# Patient Record
Sex: FEMALE | URBAN - METROPOLITAN AREA
[De-identification: names, ages, dates, MRNs, and addresses within clinical notes are randomized per-mention and may not be internally consistent; named-entity substitution may affect disease eponyms.]

---

## 2020-04-06 ENCOUNTER — APPOINTMENT (RX ONLY)
Dept: URBAN - METROPOLITAN AREA CLINIC 74 | Facility: CLINIC | Age: 30
Setting detail: DERMATOLOGY
End: 2020-04-06

## 2020-04-06 DIAGNOSIS — T07XXXA INSECT BITE, NONVENOMOUS, OF OTHER, MULTIPLE, AND UNSPECIFIED SITES, WITHOUT MENTION OF INFECTION: ICD-10-CM

## 2020-04-06 PROBLEM — L30.9 DERMATITIS, UNSPECIFIED: Status: ACTIVE | Noted: 2020-04-06

## 2020-04-06 PROCEDURE — ? COUNSELING

## 2020-04-06 PROCEDURE — 99202 OFFICE O/P NEW SF 15 MIN: CPT | Mod: 95

## 2020-04-06 PROCEDURE — ? PRESCRIPTION

## 2020-04-06 PROCEDURE — ? TREATMENT REGIMEN

## 2020-04-06 RX ORDER — MUPIROCIN 20 MG/G
OINTMENT TOPICAL
Qty: 1 | Refills: 1 | Status: ERX | COMMUNITY
Start: 2020-04-06

## 2020-04-06 RX ORDER — FLUOCINONIDE 0.5 MG/G
OINTMENT TOPICAL
Qty: 1 | Refills: 1 | Status: ERX | COMMUNITY
Start: 2020-04-06

## 2020-04-06 RX ADMIN — FLUOCINONIDE: 0.5 OINTMENT TOPICAL at 00:00

## 2020-04-06 RX ADMIN — MUPIROCIN: 20 OINTMENT TOPICAL at 00:00

## 2020-04-06 ASSESSMENT — LOCATION SIMPLE DESCRIPTION DERM
LOCATION SIMPLE: LEFT ANKLE
LOCATION SIMPLE: LEFT THIGH
LOCATION SIMPLE: RIGHT THIGH

## 2020-04-06 ASSESSMENT — LOCATION DETAILED DESCRIPTION DERM
LOCATION DETAILED: RIGHT ANTERIOR PROXIMAL THIGH
LOCATION DETAILED: LEFT ANKLE
LOCATION DETAILED: LEFT ANTERIOR LATERAL MALLEOLUS
LOCATION DETAILED: LEFT ANTERIOR PROXIMAL THIGH

## 2020-04-06 ASSESSMENT — LOCATION ZONE DERM: LOCATION ZONE: LEG

## 2020-04-06 NOTE — PROCEDURE: TREATMENT REGIMEN
Initiate Treatment: Doxycycline 100mg po BID x10 days \\nMupirocin ointment mix with Fluocinonide ointment apply to affected areas on legs BID x7-10 days
Detail Level: Zone
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
## 2020-04-06 NOTE — HPI: RASH
What Type Of Note Output Would You Prefer (Optional)?: Standard Output
How Severe Is Your Rash?: moderate
Is This A New Presentation, Or A Follow-Up?: Rash
Additional History: Rash inner thighs, legs and buttocks recurring since late January (pt says rash looks like small bumps ?bites that are extremely itchy). Especially at night. Patient reports she has bruising from scratching. Patient thought was allergy to detergent to changed detergent/soaps but didn’t help. Patient has checked bed for bed bugs but didn’t see any indication. Doesn’t not recall exposure to plants or brush or being bit by something. She has tried otc HC, anti fungal cream and old betamethasone Rx: steroids helped some with itching but not resolving. Patient is sending pics; see attachments. No contacts with similar rash.

## 2020-04-29 ENCOUNTER — APPOINTMENT (RX ONLY)
Dept: URBAN - METROPOLITAN AREA CLINIC 73 | Facility: CLINIC | Age: 30
Setting detail: DERMATOLOGY
End: 2020-04-29

## 2020-04-29 VITALS — TEMPERATURE: 97.3 F

## 2020-04-29 DIAGNOSIS — L738 OTHER SPECIFIED DISEASES OF HAIR AND HAIR FOLLICLES: ICD-10-CM

## 2020-04-29 DIAGNOSIS — L663 OTHER SPECIFIED DISEASES OF HAIR AND HAIR FOLLICLES: ICD-10-CM

## 2020-04-29 DIAGNOSIS — L73.9 FOLLICULAR DISORDER, UNSPECIFIED: ICD-10-CM

## 2020-04-29 DIAGNOSIS — T07XXXA INSECT BITE, NONVENOMOUS, OF OTHER, MULTIPLE, AND UNSPECIFIED SITES, WITHOUT MENTION OF INFECTION: ICD-10-CM | Status: INADEQUATELY CONTROLLED

## 2020-04-29 PROBLEM — L02.425 FURUNCLE OF RIGHT LOWER LIMB: Status: ACTIVE | Noted: 2020-04-29

## 2020-04-29 PROBLEM — L02.426 FURUNCLE OF LEFT LOWER LIMB: Status: ACTIVE | Noted: 2020-04-29

## 2020-04-29 PROBLEM — L30.9 DERMATITIS, UNSPECIFIED: Status: ACTIVE | Noted: 2020-04-29

## 2020-04-29 PROCEDURE — ? TREATMENT REGIMEN

## 2020-04-29 PROCEDURE — ? COUNSELING

## 2020-04-29 PROCEDURE — 11104 PUNCH BX SKIN SINGLE LESION: CPT

## 2020-04-29 PROCEDURE — ? BIOPSY BY PUNCH METHOD

## 2020-04-29 PROCEDURE — 99213 OFFICE O/P EST LOW 20 MIN: CPT | Mod: 25

## 2020-04-29 ASSESSMENT — LOCATION DETAILED DESCRIPTION DERM
LOCATION DETAILED: LEFT ANTERIOR PROXIMAL THIGH
LOCATION DETAILED: LEFT POPLITEAL SKIN
LOCATION DETAILED: LEFT DISTAL PRETIBIAL REGION
LOCATION DETAILED: LEFT ANTERIOR LATERAL MALLEOLUS
LOCATION DETAILED: LEFT HYPOTHENAR EMINENCE
LOCATION DETAILED: LEFT RIB CAGE
LOCATION DETAILED: RIGHT ANTERIOR PROXIMAL THIGH
LOCATION DETAILED: RIGHT DISTAL PRETIBIAL REGION
LOCATION DETAILED: RIGHT VENTRAL WRIST
LOCATION DETAILED: LEFT ANKLE

## 2020-04-29 ASSESSMENT — LOCATION SIMPLE DESCRIPTION DERM
LOCATION SIMPLE: LEFT ANKLE
LOCATION SIMPLE: RIGHT THIGH
LOCATION SIMPLE: ABDOMEN
LOCATION SIMPLE: LEFT HAND
LOCATION SIMPLE: RIGHT WRIST
LOCATION SIMPLE: RIGHT PRETIBIAL REGION
LOCATION SIMPLE: LEFT THIGH
LOCATION SIMPLE: LEFT POPLITEAL SKIN
LOCATION SIMPLE: LEFT PRETIBIAL REGION

## 2020-04-29 ASSESSMENT — LOCATION ZONE DERM
LOCATION ZONE: ARM
LOCATION ZONE: TRUNK
LOCATION ZONE: LEG
LOCATION ZONE: HAND

## 2020-04-29 ASSESSMENT — SEVERITY ASSESSMENT: SEVERITY: MILD TO MODERATE

## 2020-04-29 ASSESSMENT — PAIN INTENSITY VAS: HOW INTENSE IS YOUR PAIN 0 BEING NO PAIN, 10 BEING THE MOST SEVERE PAIN POSSIBLE?: NO PAIN
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
## 2020-04-29 NOTE — PROCEDURE: BIOPSY BY PUNCH METHOD
Detail Level: Detailed
Was A Bandage Applied: Yes
Punch Size In Mm: 3
Biopsy Type: H and E
Anesthesia Type: 1% lidocaine with epinephrine
Anesthesia Volume In Cc (Will Not Render If 0): 0.5
Additional Anesthesia Volume In Cc (Will Not Render If 0): 0
Hemostasis: Pressure
Epidermal Sutures: 4-0 Monosof
Number Of Epidermal Sutures (Optional): 1
Wound Care: Vaseline
Dressing: bandage
Suture Removal: 14 days
Patient Will Remove Sutures At Home?: No
Lab: [id number]
Lab Facility: [id number]
Consent: Written consent was obtained and risks were reviewed including but not limited to scarring, infection, bleeding, scabbing, incomplete removal, nerve damage and allergy to anesthesia.
Post-Care Instructions: I reviewed with the patient in detail post-care instructions. Patient is to keep the biopsy site dry overnight, and then apply bacitracin twice daily until healed. Patient may apply hydrogen peroxide soaks to remove any crusting.
Home Suture Removal Text: Patient was provided a home suture removal kit and will remove their sutures at home.  If they have any questions or difficulties they will call the office.
Notification Instructions: Patient will be notified of biopsy results. However, patient instructed to call the office if not contacted within 2 weeks.
Billing Type: Third-Party Bill
Information: Selecting Yes will display possible errors in your note based on the variables you have selected. This validation is only offered as a suggestion for you. PLEASE NOTE THAT THE VALIDATION TEXT WILL BE REMOVED WHEN YOU FINALIZE YOUR NOTE. IF YOU WANT TO FAX A PRELIMINARY NOTE YOU WILL NEED TO TOGGLE THIS TO 'NO' IF YOU DO NOT WANT IT IN YOUR FAXED NOTE.

## 2020-04-29 NOTE — PROCEDURE: TREATMENT REGIMEN
Otc Regimen: Panoxyl 4% wash QD to affected areas on legs while showering
Plan: improved w/ DCN; shaving hygiene
Detail Level: Zone
Continue Regimen: Fluocinonide ointment Apply to affected areas BID x7-10 days PRN for itching

## 2020-05-15 ENCOUNTER — APPOINTMENT (RX ONLY)
Dept: URBAN - METROPOLITAN AREA CLINIC 73 | Facility: CLINIC | Age: 30
Setting detail: DERMATOLOGY
End: 2020-05-15

## 2020-05-15 DIAGNOSIS — Z48.02 ENCOUNTER FOR REMOVAL OF SUTURES: ICD-10-CM

## 2020-05-15 PROCEDURE — ? SUTURE REMOVAL (GLOBAL PERIOD)

## 2020-05-15 ASSESSMENT — LOCATION DETAILED DESCRIPTION DERM: LOCATION DETAILED: RIGHT VENTRAL WRIST

## 2020-05-15 ASSESSMENT — LOCATION ZONE DERM: LOCATION ZONE: ARM

## 2020-05-15 ASSESSMENT — PAIN INTENSITY VAS: HOW INTENSE IS YOUR PAIN 0 BEING NO PAIN, 10 BEING THE MOST SEVERE PAIN POSSIBLE?: NO PAIN

## 2020-05-15 ASSESSMENT — LOCATION SIMPLE DESCRIPTION DERM: LOCATION SIMPLE: RIGHT WRIST

## 2020-05-15 NOTE — PROCEDURE: SUTURE REMOVAL (GLOBAL PERIOD)
Detail Level: Detailed
Add 82626 Cpt? (Important Note: In 2017 The Use Of 51979 Is Being Tracked By Cms To Determine Future Global Period Reimbursement For Global Periods): yes

## 2020-05-18 ENCOUNTER — APPOINTMENT (RX ONLY)
Dept: URBAN - METROPOLITAN AREA CLINIC 73 | Facility: CLINIC | Age: 30
Setting detail: DERMATOLOGY
End: 2020-05-18

## 2020-05-18 VITALS — TEMPERATURE: 96.6 F

## 2020-05-18 DIAGNOSIS — Z48.02 ENCOUNTER FOR REMOVAL OF SUTURES: ICD-10-CM

## 2020-05-18 PROCEDURE — ? SUTURE REMOVAL (GLOBAL PERIOD)

## 2020-05-18 PROCEDURE — 99024 POSTOP FOLLOW-UP VISIT: CPT

## 2020-05-18 ASSESSMENT — LOCATION SIMPLE DESCRIPTION DERM
LOCATION SIMPLE: RIGHT WRIST
LOCATION SIMPLE: RIGHT WRIST

## 2020-05-18 ASSESSMENT — LOCATION DETAILED DESCRIPTION DERM
LOCATION DETAILED: RIGHT VENTRAL WRIST
LOCATION DETAILED: RIGHT VENTRAL WRIST

## 2020-05-18 ASSESSMENT — LOCATION ZONE DERM
LOCATION ZONE: ARM
LOCATION ZONE: ARM

## 2020-05-18 NOTE — PROCEDURE: SUTURE REMOVAL (GLOBAL PERIOD)
Detail Level: Detailed
Add 12502 Cpt? (Important Note: In 2017 The Use Of 04123 Is Being Tracked By Cms To Determine Future Global Period Reimbursement For Global Periods): yes

## 2022-05-03 ENCOUNTER — APPOINTMENT (RX ONLY)
Dept: URBAN - METROPOLITAN AREA CLINIC 74 | Facility: CLINIC | Age: 32
Setting detail: DERMATOLOGY
End: 2022-05-03

## 2022-05-03 DIAGNOSIS — T07XXXA INSECT BITE, NONVENOMOUS, OF OTHER, MULTIPLE, AND UNSPECIFIED SITES, WITHOUT MENTION OF INFECTION: ICD-10-CM

## 2022-05-03 DIAGNOSIS — L81.0 POSTINFLAMMATORY HYPERPIGMENTATION: ICD-10-CM

## 2022-05-03 DIAGNOSIS — B07.8 OTHER VIRAL WARTS: ICD-10-CM

## 2022-05-03 PROBLEM — S90.562A INSECT BITE (NONVENOMOUS), LEFT ANKLE, INITIAL ENCOUNTER: Status: ACTIVE | Noted: 2022-05-03

## 2022-05-03 PROBLEM — S90.561A INSECT BITE (NONVENOMOUS), RIGHT ANKLE, INITIAL ENCOUNTER: Status: ACTIVE | Noted: 2022-05-03

## 2022-05-03 PROCEDURE — 17110 DESTRUCTION B9 LES UP TO 14: CPT

## 2022-05-03 PROCEDURE — ? TREATMENT REGIMEN

## 2022-05-03 PROCEDURE — ? LIQUID NITROGEN

## 2022-05-03 PROCEDURE — ? DIAGNOSIS COMMENT

## 2022-05-03 PROCEDURE — 99213 OFFICE O/P EST LOW 20 MIN: CPT | Mod: 25

## 2022-05-03 PROCEDURE — ? COUNSELING

## 2022-05-03 PROCEDURE — ? PRESCRIPTION

## 2022-05-03 PROCEDURE — ? PATIENT SPECIFIC COUNSELING

## 2022-05-03 RX ORDER — FLUOCINONIDE 0.5 MG/G
OINTMENT TOPICAL
Qty: 30 | Refills: 0 | Status: ERX | COMMUNITY
Start: 2022-05-03

## 2022-05-03 RX ADMIN — FLUOCINONIDE: 0.5 OINTMENT TOPICAL at 00:00

## 2022-05-03 ASSESSMENT — LOCATION SIMPLE DESCRIPTION DERM
LOCATION SIMPLE: LEFT ANKLE
LOCATION SIMPLE: RIGHT ANKLE
LOCATION SIMPLE: RIGHT MIDDLE FINGER

## 2022-05-03 ASSESSMENT — LOCATION DETAILED DESCRIPTION DERM
LOCATION DETAILED: RIGHT POSTERIOR ANKLE
LOCATION DETAILED: LEFT ANKLE
LOCATION DETAILED: RIGHT MIDDLE FINGER DISTAL INTERPHALANGEAL JOINT

## 2022-05-03 ASSESSMENT — LOCATION ZONE DERM
LOCATION ZONE: FINGER
LOCATION ZONE: LEG

## 2022-05-03 NOTE — HPI: RASH
How Severe Is Your Rash?: moderate
Normal vision: sees adequately in most situations; can see medication labels, newsprint
Is This A New Presentation, Or A Follow-Up?: Rash
Additional History: Pt presents for a rash on her lower legs/ankles that she believes was caused by bug bites. Pt reports began blistering and slightly itchy.

## 2022-05-03 NOTE — PROCEDURE: TREATMENT REGIMEN
Initiate Treatment: Fluocinonide 0.05 % topical ointment. Apply to affected areas on body bid x 7-10 days, then break x 1 wk, repeat prn flares
Detail Level: Zone

## 2022-05-03 NOTE — PROCEDURE: MIPS QUALITY
Quality 130: Documentation Of Current Medications In The Medical Record: Current Medications Documented
Quality 110: Preventive Care And Screening: Influenza Immunization: Influenza immunization was not ordered or administered, reason not given
Additional Notes: Pt received COVID vaccines + booster
Detail Level: Detailed

## 2022-05-03 NOTE — HPI: SKIN LESION
Is This A New Presentation, Or A Follow-Up?: Skin Lesion
How Severe Is Your Skin Lesion?: moderate
Has Your Skin Lesion Been Treated?: not been treated
Additional History: Pt presents for a suspicious growth on the right middle finger. Pt reports present for at least a yr, but denies any pain or discomfort.

## 2022-05-03 NOTE — PROCEDURE: LIQUID NITROGEN
Medical Necessity Clause: This procedure was medically necessary because the lesions that were treated were:
Duration Of Freeze Thaw-Cycle (Seconds): 5-10
Spray Paint Technique: No
Show Applicator Variable?: Yes
Medical Necessity Information: It is in your best interest to select a reason for this procedure from the list below. All of these items fulfill various CMS LCD requirements except the new and changing color options.
Number Of Freeze-Thaw Cycles: 3 freeze-thaw cycles
Post-Care Instructions: I reviewed with the patient in detail post-care instructions. Patient is to wear sunprotection, and avoid picking at any of the treated lesions. Pt may apply Vaseline to crusted or scabbing areas.
Detail Level: Detailed
Spray Paint Text: The liquid nitrogen was applied to the skin utilizing a spray paint frosting technique.
Consent: The patient's consent was obtained including but not limited to risks of crusting, scabbing, blistering, scarring, darker or lighter pigmentary change, recurrence, incomplete removal and infection.

## 2022-05-03 NOTE — PROCEDURE: PATIENT SPECIFIC COUNSELING
Detail Level: Zone
- pt presents for a rash on the ankles/lower legs x 2 wks; states blistering and itchy\\n- states may have occurred after walking dogs in the park\\n- disc possible rhus dermatitis on right posterior leg\\n- will send fluocinonide 0.05% oint bid x up to 2 weeks \\n- disc may be discolored for a while, but will improve with time
- pt reports present x 1 yr\\n- disc LN2 (r/b/se); pt opted for tx\\n- disc may need multiple tx’s\\n- RTC 1 mo/prn
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
## 2023-02-21 ENCOUNTER — APPOINTMENT (RX ONLY)
Dept: URBAN - METROPOLITAN AREA CLINIC 74 | Facility: CLINIC | Age: 33
Setting detail: DERMATOLOGY
End: 2023-02-21

## 2023-02-21 DIAGNOSIS — L60.8 OTHER NAIL DISORDERS: ICD-10-CM

## 2023-02-21 DIAGNOSIS — B35.1 TINEA UNGUIUM: ICD-10-CM

## 2023-02-21 PROCEDURE — ? DEFER

## 2023-02-21 PROCEDURE — ? PRESCRIPTION

## 2023-02-21 PROCEDURE — ? COUNSELING

## 2023-02-21 PROCEDURE — 99213 OFFICE O/P EST LOW 20 MIN: CPT

## 2023-02-21 PROCEDURE — ? PATIENT SPECIFIC COUNSELING

## 2023-02-21 PROCEDURE — ? TREATMENT REGIMEN

## 2023-02-21 RX ORDER — CICLOPIROX 80 MG/ML
SOLUTION TOPICAL
Qty: 6.6 | Refills: 4 | Status: ERX | COMMUNITY
Start: 2023-02-21

## 2023-02-21 RX ADMIN — CICLOPIROX: 80 SOLUTION TOPICAL at 00:00

## 2023-02-21 ASSESSMENT — LOCATION SIMPLE DESCRIPTION DERM
LOCATION SIMPLE: LEFT GREAT TOE
LOCATION SIMPLE: RIGHT 2ND TOE
LOCATION SIMPLE: LEFT THUMBNAIL
LOCATION SIMPLE: LEFT 4TH TOE
LOCATION SIMPLE: LEFT 3RD TOE
LOCATION SIMPLE: LEFT 2ND TOE
LOCATION SIMPLE: RIGHT GREAT TOE
LOCATION SIMPLE: RIGHT 3RD TOE
LOCATION SIMPLE: RIGHT 4TH TOE

## 2023-02-21 ASSESSMENT — LOCATION DETAILED DESCRIPTION DERM
LOCATION DETAILED: RIGHT GREAT TOENAIL
LOCATION DETAILED: LEFT THUMBNAIL
LOCATION DETAILED: RIGHT 2ND TOENAIL
LOCATION DETAILED: LEFT 3RD TOENAIL
LOCATION DETAILED: RIGHT 4TH TOENAIL
LOCATION DETAILED: LEFT 4TH TOENAIL
LOCATION DETAILED: LEFT 2ND TOENAIL
LOCATION DETAILED: RIGHT 3RD TOENAIL
LOCATION DETAILED: LEFT GREAT TOENAIL

## 2023-02-21 ASSESSMENT — LOCATION ZONE DERM
LOCATION ZONE: FINGERNAIL
LOCATION ZONE: TOENAIL

## 2023-02-21 NOTE — PROCEDURE: MIPS QUALITY
Additional Notes: Pt declined influenza vaccination
Quality 130: Documentation Of Current Medications In The Medical Record: Current Medications Documented
Detail Level: Detailed
Quality 110: Preventive Care And Screening: Influenza Immunization: Influenza Immunization not Administered for Documented Reasons.

## 2023-02-21 NOTE — PROCEDURE: PATIENT SPECIFIC COUNSELING
- reasons of nail discoloration: Medications, mole in matrix, melanoma, or genetics \\n- for definitive diagnosis, strongly recommend nail biopsy.\\n- after biopsy, pt will likely have a chronic nail deformity\\n- disc melanoma can be asymptomatic and doesn’t always have symptoms \\n- pt declined biopsy today despite our strong recommendation. She wishes to defer biopsy\\n- will cont to monitor. Photo was taken.\\n- wouldn’t recommend manicures with UV light.\\n- disc not likely due to trauma from SNS manicure\\n- size 0.25cm\\n- another option is pt to f.up with dermatologist in [place] \\n- RTC in 3 months

## 2023-02-21 NOTE — PROCEDURE: TREATMENT REGIMEN
Initiate Treatment: Penlac Apply to affected nails QD. Wipe with alcohol once a week
Detail Level: Zone

## 2023-02-21 NOTE — PROCEDURE: DEFER
X Size Of Lesion In Cm (Optional): 0
Detail Level: Detailed
Introduction Text (Please End With A Colon): The following procedure was deferred:
Procedure To Be Performed At Next Visit: Biopsy by punch method
Instructions (Optional): Continue to monitor

## 2024-03-20 ENCOUNTER — OFFICE VISIT (OUTPATIENT)
Dept: INTERNAL MEDICINE | Facility: CLINIC | Age: 34
End: 2024-03-20
Payer: COMMERCIAL

## 2024-03-20 VITALS
OXYGEN SATURATION: 99 % | HEIGHT: 67 IN | WEIGHT: 177.4 LBS | RESPIRATION RATE: 18 BRPM | SYSTOLIC BLOOD PRESSURE: 121 MMHG | HEART RATE: 73 BPM | DIASTOLIC BLOOD PRESSURE: 70 MMHG | BODY MASS INDEX: 27.84 KG/M2

## 2024-03-20 DIAGNOSIS — Z86.39 HISTORY OF VITAMIN D DEFICIENCY: ICD-10-CM

## 2024-03-20 DIAGNOSIS — E03.9 HYPOTHYROIDISM, UNSPECIFIED TYPE: ICD-10-CM

## 2024-03-20 DIAGNOSIS — Z76.89 ENCOUNTER TO ESTABLISH CARE WITH NEW DOCTOR: ICD-10-CM

## 2024-03-20 DIAGNOSIS — Z00.00 PREVENTATIVE HEALTH CARE: ICD-10-CM

## 2024-03-20 DIAGNOSIS — Z86.39 HISTORY OF OBESITY: ICD-10-CM

## 2024-03-20 DIAGNOSIS — L65.8 FEMALE PATTERN BALDNESS: ICD-10-CM

## 2024-03-20 DIAGNOSIS — H61.23 BILATERAL IMPACTED CERUMEN: Primary | ICD-10-CM

## 2024-03-20 PROBLEM — B00.9 HSV-2 INFECTION: Status: ACTIVE | Noted: 2024-03-20

## 2024-03-20 PROBLEM — D25.0 SUBMUCOUS LEIOMYOMA OF UTERUS: Status: ACTIVE | Noted: 2024-03-20

## 2024-03-20 LAB
ALBUMIN SERPL-MCNC: 5 G/DL (ref 3.5–5.7)
ALP SERPL-CCNC: 52 IU/L (ref 34–125)
ALT SERPL-CCNC: 13 IU/L (ref 7–52)
ANION GAP SERPL CALC-SCNC: 12 MEQ/L (ref 3–15)
AST SERPL-CCNC: 18 IU/L (ref 13–39)
BILIRUB SERPL-MCNC: 0.6 MG/DL (ref 0.3–1.2)
BUN SERPL-MCNC: 9 MG/DL (ref 7–25)
CALCIUM SERPL-MCNC: 9.7 MG/DL (ref 8.6–10.3)
CHLORIDE SERPL-SCNC: 103 MEQ/L (ref 98–107)
CHOLEST SERPL-MCNC: 174 MG/DL
CO2 SERPL-SCNC: 23 MEQ/L (ref 21–31)
CREAT SERPL-MCNC: 0.6 MG/DL (ref 0.6–1.2)
EGFRCR SERPLBLD CKD-EPI 2021: >60 ML/MIN/1.73M*2
EST. AVERAGE GLUCOSE BLD GHB EST-MCNC: 91 MG/DL
GLUCOSE SERPL-MCNC: 83 MG/DL (ref 70–99)
HAV IGM SER QL: NONREACTIVE
HBA1C MFR BLD: 4.8 %
HBV CORE IGM SER QL: NONREACTIVE
HBV SURFACE AG SER QL: NONREACTIVE
HCV AB SER QL: NONREACTIVE
HDLC SERPL-MCNC: 62 MG/DL
HDLC SERPL: 2.8 {RATIO}
HIV 1+2 AB+HIV1 P24 AG SERPL QL IA: NONREACTIVE
LDLC SERPL CALC-MCNC: 101 MG/DL
NONHDLC SERPL-MCNC: 112 MG/DL
POTASSIUM SERPL-SCNC: 4.4 MEQ/L (ref 3.5–5.1)
PROT SERPL-MCNC: 8.2 G/DL (ref 6–8.2)
SODIUM SERPL-SCNC: 138 MEQ/L (ref 136–145)
TRIGL SERPL-MCNC: 55 MG/DL

## 2024-03-20 PROCEDURE — 80061 LIPID PANEL: CPT | Performed by: INTERNAL MEDICINE

## 2024-03-20 PROCEDURE — 3008F BODY MASS INDEX DOCD: CPT | Performed by: INTERNAL MEDICINE

## 2024-03-20 PROCEDURE — 99204 OFFICE O/P NEW MOD 45 MIN: CPT | Performed by: INTERNAL MEDICINE

## 2024-03-20 PROCEDURE — 80053 COMPREHEN METABOLIC PANEL: CPT | Performed by: INTERNAL MEDICINE

## 2024-03-20 PROCEDURE — 87389 HIV-1 AG W/HIV-1&-2 AB AG IA: CPT | Performed by: INTERNAL MEDICINE

## 2024-03-20 PROCEDURE — 83036 HEMOGLOBIN GLYCOSYLATED A1C: CPT | Performed by: INTERNAL MEDICINE

## 2024-03-20 PROCEDURE — 80074 ACUTE HEPATITIS PANEL: CPT | Performed by: INTERNAL MEDICINE

## 2024-03-20 RX ORDER — ACETAMINOPHEN 500 MG
1 TABLET ORAL DAILY
COMMUNITY
Start: 2024-01-01

## 2024-03-20 RX ORDER — LEVOTHYROXINE SODIUM 50 UG/1
1 TABLET ORAL
COMMUNITY
End: 2024-03-20 | Stop reason: ALTCHOICE

## 2024-03-20 RX ORDER — FOLIC ACID, .BETA.-CAROTENE, ASCORBIC ACID, CHOLECALCIFEROL, .ALPHA.-TOCOPHEROL ACETATE, DL-, THIAMINE MONONITRATE, RIBOFLAVIN, NIACINAMIDE, PYRIDOXINE HYDROCHLORIDE, CYANOCOBALAMIN, CALCIUM PANTOTHENATE, CALCIUM CARBONATE, FERROUS FUMARATE, AND ZINC OXIDE 1; 1000; 100; 400; 30; 3; 3; 15; 20; 12; 7; 200; 29; 20 MG/1; [IU]/1; MG/1; [IU]/1; [IU]/1; MG/1; MG/1; MG/1; MG/1; UG/1; MG/1; MG/1; MG/1; MG/1
1 TABLET, CHEWABLE ORAL DAILY
COMMUNITY
Start: 2023-12-22

## 2024-03-20 RX ORDER — ABACAVIR SULFATE, DOLUTEGRAVIR SODIUM, LAMIVUDINE 600; 50; 300 MG/1; MG/1; MG/1
1 TABLET, FILM COATED ORAL DAILY
COMMUNITY
Start: 2024-01-12 | End: 2024-03-20

## 2024-03-20 RX ORDER — SEMAGLUTIDE 1 MG/.5ML
1 INJECTION, SOLUTION SUBCUTANEOUS
COMMUNITY
Start: 2024-03-06

## 2024-03-20 RX ORDER — MULTIVIT WITH MINERALS/HERBS
1 TABLET ORAL DAILY
COMMUNITY
Start: 2024-01-01

## 2024-03-20 RX ORDER — LANOLIN ALCOHOL/MO/W.PET/CERES
1000 CREAM (GRAM) TOPICAL DAILY
COMMUNITY

## 2024-03-20 RX ORDER — BLOOD SUGAR DIAGNOSTIC
1 STRIP MISCELLANEOUS DAILY
COMMUNITY
Start: 2024-02-22 | End: 2024-03-20 | Stop reason: ALTCHOICE

## 2024-03-20 RX ORDER — CALCIUM CARBONATE/VITAMIN D3 250-3.125
TABLET ORAL DAILY
COMMUNITY
End: 2024-03-20 | Stop reason: ALTCHOICE

## 2024-03-20 ASSESSMENT — ENCOUNTER SYMPTOMS
WHEEZING: 0
EYE ITCHING: 0
DIZZINESS: 0
SORE THROAT: 0
SHORTNESS OF BREATH: 0
MYALGIAS: 0
LIGHT-HEADEDNESS: 0
COUGH: 0
DYSPHORIC MOOD: 0
BACK PAIN: 0
PALPITATIONS: 0
TROUBLE SWALLOWING: 0
RHINORRHEA: 0
TREMORS: 0
CHILLS: 0
FATIGUE: 0
ABDOMINAL PAIN: 0
NERVOUS/ANXIOUS: 0
HALLUCINATIONS: 0
VOMITING: 0
WEAKNESS: 0
ADENOPATHY: 0
ARTHRALGIAS: 0
NUMBNESS: 0
DIARRHEA: 0
FREQUENCY: 0
SINUS PRESSURE: 0
HEADACHES: 0
FLANK PAIN: 0
CONSTIPATION: 0
DYSURIA: 0
DIAPHORESIS: 0
BLOOD IN STOOL: 0
EYE PAIN: 0
DECREASED CONCENTRATION: 0
EYE REDNESS: 0
NAUSEA: 0

## 2024-03-20 ASSESSMENT — PATIENT HEALTH QUESTIONNAIRE - PHQ9: SUM OF ALL RESPONSES TO PHQ9 QUESTIONS 1 & 2: 0

## 2024-03-20 NOTE — PROGRESS NOTES
[facility]      Reason for visit:   Chief Complaint   Patient presents with   • Establish Care   • Alopecia   • Cerumen Impaction      Samia Degroot is a 33 y.o. female.    HPI The patient has a PMH of HSV infection, fibroids, ?hypothyroidism, h/o obesity, h/o Vit D def, and presents to establish care today. She is concerned over hair loss on the front of her scalp and inquires as to whether it would be safe to take Spironolactone and minoxidil wish was suggested by her dermatologist. Long discussion was had about the side effects of these meds and it was recommended to try a hair vitamin or rice water treatment instead since she is trying to have a baby. She also c/o of earwax buildup and asks for referral to ENT to have it cleaned out.      Allergies: lactose  Medications: B12, Vit D, prenatal vitamin, Mg, Wegovy  Past Hospitalizations/Surgeries:  wisdom teeth removal (2017), EGD (2012)  Social History: denies cigarette smoking, drinks a glass of wine monthly, denies illicit drug use, works for a medical device company, eats out 4x monthly, walks for exercise 3x weekly  Sexual History: HSV infection  Family History: mom with HTN, maternal aunt with HTN and MI in her 40's, 2 maternal uncles with HTN, maternal grandma with DM II and unknown cancer, maternal great-grandma with MI, paternal grandaunt with breast cancer         Review of Systems   Constitutional: Negative for chills, diaphoresis and fatigue.   HENT: Negative for congestion, rhinorrhea, sinus pressure, sore throat and trouble swallowing.    Eyes: Negative for pain, redness, itching and visual disturbance.   Respiratory: Negative for cough, shortness of breath and wheezing.    Cardiovascular: Negative for chest pain, palpitations and leg swelling.   Gastrointestinal: Negative for abdominal pain, blood in stool, constipation, diarrhea, nausea and vomiting.   Endocrine: Negative for cold intolerance, heat intolerance and polyuria.        Female pattern hair  "loss     Genitourinary: Negative for dysuria, flank pain, frequency and urgency.   Musculoskeletal: Negative for arthralgias, back pain and myalgias.   Skin: Negative for rash.   Neurological: Negative for dizziness, tremors, weakness, light-headedness, numbness and headaches.   Hematological: Negative for adenopathy.   Psychiatric/Behavioral: Negative for decreased concentration, dysphoric mood, hallucinations and suicidal ideas. The patient is not nervous/anxious.        Objective   Vitals:    03/20/24 1301   BP: 121/70   BP Location: Left upper arm   Patient Position: Sitting   Pulse: 73   Resp: 18   SpO2: 99%   Weight: 80.5 kg (177 lb 6.4 oz)   Height: 1.702 m (5' 7\")     Body mass index is 27.78 kg/m².    Physical Exam  Vitals reviewed.   Constitutional:       General: She is not in acute distress.  HENT:      Head: Normocephalic and atraumatic.      Right Ear: There is impacted cerumen.      Left Ear: There is impacted cerumen.      Nose: Nose normal.   Eyes:      Extraocular Movements: Extraocular movements intact.      Conjunctiva/sclera: Conjunctivae normal.      Pupils: Pupils are equal, round, and reactive to light.   Cardiovascular:      Rate and Rhythm: Normal rate and regular rhythm.      Heart sounds: Normal heart sounds. No murmur heard.  Pulmonary:      Effort: Pulmonary effort is normal. No respiratory distress.      Breath sounds: Normal breath sounds. No wheezing, rhonchi or rales.   Abdominal:      General: Bowel sounds are normal.      Palpations: Abdomen is soft.   Musculoskeletal:         General: No swelling or tenderness. Normal range of motion.      Cervical back: Normal range of motion and neck supple.   Skin:     General: Skin is warm and dry.      Capillary Refill: Capillary refill takes less than 2 seconds.      Findings: No rash.   Neurological:      General: No focal deficit present.      Mental Status: She is alert and oriented to person, place, and time. Mental status is at " baseline.      Cranial Nerves: No cranial nerve deficit.      Motor: No weakness.   Psychiatric:         Mood and Affect: Mood normal.         Behavior: Behavior normal.                 Assessment/Plan    Bilateral Cerumen Impaction  -Patient with ear wax buildup causing minor hearing loss  -Will refer to ENT    History of Vitamin D Deficiency   -Last Vit D level of 43.7 in Mar 2024  -Will continue Vit D supplementation    History of Obesity   -Current BMI of 27.78, previously had BMI >30  -Patient following at [facility] and now on Wegovy 1 mg qWeekly  -Patient congratulated on success and encouraged to continue      ?Hypothyroidism   -Patient states she was told she had nodules and hypothyroidism in her [place] country but is not sure of the diagnosis  -Last TFT's showed TSH 0.874, T4 1.26 in Mar 2024 with patient off Levothyroxine so likely does not have   -Will monitor    Female Pattern Baldness  -Patient reports thinning in front of hair, states mother has it too  -TFT's WNL in Mar 2024  -Patient seeing [facility] dermatology who suggested Spironolactone and/or Minoxidil  -Due to patient desire for children, will hold off on those agents at this time  -Patient encouraged to take Maxi Hair hair vitamin or try rice water treatents    Preventative   PAP/Annual: will refer today    TdaP: will discuss at next visit  Flu shot: will offer in Fall 2024    COVID Status: Vaccinated (Mar/Apr 2021)     Samia was seen today for establish care, alopecia and cerumen impaction.    Diagnoses and all orders for this visit:    Bilateral impacted cerumen  -     Ambulatory referral to ENT; Future    Hypothyroidism, unspecified type    History of obesity    History of vitamin D deficiency    Female pattern baldness    Encounter to establish care with new doctor    Preventative health care  -     Ambulatory referral to Gynecology; Future  -     HIV 1,2 AB P24 AG  -     Hepatitis panel, acute  -     Lipid panel  -     Hemoglobin  A1c  -     Comprehensive metabolic panel

## 2024-05-21 ENCOUNTER — TELEPHONE (OUTPATIENT)
Dept: OBSTETRICS AND GYNECOLOGY | Facility: CLINIC | Age: 34
End: 2024-05-21
Payer: COMMERCIAL

## 2024-06-25 ENCOUNTER — TRANSCRIBE ORDERS (OUTPATIENT)
Dept: SCHEDULING | Age: 34
End: 2024-06-25

## 2024-06-25 DIAGNOSIS — O03.9 COMPLETE OR UNSPECIFIED SPONTANEOUS ABORTION WITHOUT COMPLICATION: ICD-10-CM

## 2024-06-25 DIAGNOSIS — R10.2 PELVIC AND PERINEAL PAIN: Primary | ICD-10-CM

## 2024-06-27 ENCOUNTER — HOSPITAL ENCOUNTER (OUTPATIENT)
Dept: RADIOLOGY | Facility: HOSPITAL | Age: 34
Discharge: HOME | End: 2024-06-27
Attending: OBSTETRICS & GYNECOLOGY
Payer: COMMERCIAL

## 2024-06-27 DIAGNOSIS — O03.9 COMPLETE OR UNSPECIFIED SPONTANEOUS ABORTION WITHOUT COMPLICATION: ICD-10-CM

## 2024-06-27 PROCEDURE — 76856 US EXAM PELVIC COMPLETE: CPT | Mod: 59

## 2024-07-05 DIAGNOSIS — M25.561 RIGHT KNEE PAIN, UNSPECIFIED CHRONICITY: Primary | ICD-10-CM

## 2024-07-08 ENCOUNTER — OFFICE VISIT (OUTPATIENT)
Dept: SURGERY | Facility: CLINIC | Age: 34
End: 2024-07-08
Payer: COMMERCIAL

## 2024-07-08 ENCOUNTER — HOSPITAL ENCOUNTER (OUTPATIENT)
Dept: RADIOLOGY | Facility: CLINIC | Age: 34
Discharge: HOME | End: 2024-07-08
Attending: ORTHOPAEDIC SURGERY
Payer: COMMERCIAL

## 2024-07-08 DIAGNOSIS — M54.50 LOW BACK PAIN, NON-SPECIFIC: ICD-10-CM

## 2024-07-08 DIAGNOSIS — M54.16 LUMBAR RADICULOPATHY: Primary | ICD-10-CM

## 2024-07-08 DIAGNOSIS — M25.561 RIGHT KNEE PAIN, UNSPECIFIED CHRONICITY: ICD-10-CM

## 2024-07-08 DIAGNOSIS — M54.50 LOW BACK PAIN, NON-SPECIFIC: Primary | ICD-10-CM

## 2024-07-08 DIAGNOSIS — M51.369 DDD (DEGENERATIVE DISC DISEASE), LUMBAR: ICD-10-CM

## 2024-07-08 PROCEDURE — 99204 OFFICE O/P NEW MOD 45 MIN: CPT | Performed by: ORTHOPAEDIC SURGERY

## 2024-07-08 PROCEDURE — 72110 X-RAY EXAM L-2 SPINE 4/>VWS: CPT

## 2024-07-08 RX ORDER — METHYLPREDNISOLONE 4 MG/1
TABLET ORAL
Qty: 21 TABLET | Refills: 0 | Status: SHIPPED | OUTPATIENT
Start: 2024-07-08

## 2024-07-08 NOTE — PROGRESS NOTES
Subjective   Patient ID: Samia Degroot is a 34 y.o. female.    Chief Complaint : Low back pain, intermittent left leg pain    History of Present Illness: Samia Degroot is a 34 y.o. female who presents to the office today with the above stated complaints.  She states she has had a couple month history of the above-stated complaints.  Her low back and leg pain is episodic, her last 1 being a couple of weeks ago.  Currently in the office she denies any pain.  For her pain in the past she has utilized anti-inflammatory medications and Tylenol.  Has not trialed formal physical therapy.  Again currently she denies any radicular symptoms, however she does note that when she has them they radiate down the posterior aspect of her left thigh.  She denies any bowel or bladder changes any recent fevers chills night sweats recent trauma or illness.    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    Submucous leiomyoma of uterus 3/20/2024         Past Surgical History :   Past Surgical History:   Procedure Laterality Date    ESOPHAGOGASTRODUODENOSCOPY  01/01/2012    done in [place]    WISDOM TOOTH EXTRACTION  01/01/2017       Family History :  Family History   Problem Relation Age of Onset    Hypertension Biological Mother     Diabetes type II Maternal Grandmother     Cancer Maternal Grandmother     Breast cancer Other     Hypertension Mother's Brother     Hypertension Mother's Brother     Hypertension Mother's Sister     Heart attack Mother's Sister         in her 40's    Heart attack Maternal Great-Grandmother        Social History :  Social History     Social History Narrative    Not on file       REVIEW OF SYSTEMS : All others negative other than specified in the HPI.    Physical Examination : There were no vitals taken for this  visit.    General: No acute distress  Neuro: Awake, Alert, Oriented x 3.    Eyes: Pupils equal and round  Mouth: Oropharynx clear  Respiratory: Breathing unlabored  Cardio: no edema or cyanosis  Skin: no rashes    Musculoskeletal: Patient has full stable range of motion of lumbar spine.  There is no pain with range of motion.  No pain with forward flexion or extension.  No pain to palpation over the paraspinal musculature.  No pain to palpation over any bony prominence.  No pain with range of motion of bilateral hips and they are stable.  No pain to palpation over the peritrochanteric region bilaterally.  There is a negative straight leg raise bilaterally.  Overall neurovascular intact in bilateral lower extremities and is full strength at 5 out of 5 throughout.  Negative clonus and downgoing Babinski.  Walks with a normal gait with no assisted device.    IMAGING : X-rays of the lumbar spine taken today were personally reviewed which demonstrates mild degenerative changes at L5-S1    IMPRESSION : 1.  Low back pain  2.  Lumbar radiculopathy    PLAN : At this time we discussed the patient's history and physical exam in detail.  We discussed conservative management provided with a prescription for physical therapy to evaluate and treat her lumbar spine.  We also provide her with a Medrol Dosepak and instructions were given.  She was instructed to follow-up in the office on a as needed basis    YANIQUE Dos Santos  7/8/2024  3:07 PM

## 2024-09-16 ENCOUNTER — OFFICE VISIT (OUTPATIENT)
Dept: INTERNAL MEDICINE | Facility: CLINIC | Age: 34
End: 2024-09-16
Payer: COMMERCIAL

## 2024-09-16 VITALS
RESPIRATION RATE: 18 BRPM | DIASTOLIC BLOOD PRESSURE: 60 MMHG | WEIGHT: 185 LBS | HEIGHT: 67 IN | SYSTOLIC BLOOD PRESSURE: 104 MMHG | BODY MASS INDEX: 29.03 KG/M2

## 2024-09-16 DIAGNOSIS — Z23 NEED FOR TETANUS, DIPHTHERIA, AND ACELLULAR PERTUSSIS (TDAP) VACCINE: ICD-10-CM

## 2024-09-16 DIAGNOSIS — L65.8 FEMALE PATTERN BALDNESS: Primary | ICD-10-CM

## 2024-09-16 DIAGNOSIS — Z86.39 HISTORY OF OBESITY: ICD-10-CM

## 2024-09-16 PROCEDURE — 90471 IMMUNIZATION ADMIN: CPT | Performed by: INTERNAL MEDICINE

## 2024-09-16 PROCEDURE — 99213 OFFICE O/P EST LOW 20 MIN: CPT | Mod: 25 | Performed by: INTERNAL MEDICINE

## 2024-09-16 PROCEDURE — 3008F BODY MASS INDEX DOCD: CPT | Performed by: INTERNAL MEDICINE

## 2024-09-16 PROCEDURE — 90715 TDAP VACCINE 7 YRS/> IM: CPT | Performed by: INTERNAL MEDICINE

## 2024-09-16 ASSESSMENT — ENCOUNTER SYMPTOMS
BACK PAIN: 0
LIGHT-HEADEDNESS: 0
FLANK PAIN: 0
DIAPHORESIS: 0
DIZZINESS: 0
SHORTNESS OF BREATH: 0
SINUS PRESSURE: 0
CONSTIPATION: 0
TROUBLE SWALLOWING: 0
EYE ITCHING: 0
EYE PAIN: 0
EYE REDNESS: 0
NAUSEA: 0
TREMORS: 0
WHEEZING: 0
ARTHRALGIAS: 0
FREQUENCY: 0
DIARRHEA: 0
DYSURIA: 0
CHILLS: 0
HEADACHES: 0
DYSPHORIC MOOD: 0
VOMITING: 0
PALPITATIONS: 0
NERVOUS/ANXIOUS: 0
COUGH: 0
DECREASED CONCENTRATION: 0
FATIGUE: 0
WEAKNESS: 0
BLOOD IN STOOL: 0
ADENOPATHY: 0
ABDOMINAL PAIN: 0
RHINORRHEA: 0
MYALGIAS: 0
HALLUCINATIONS: 0
NUMBNESS: 0
SORE THROAT: 0

## 2024-09-16 NOTE — PROGRESS NOTES
"   [facility]      Reason for visit:   Chief Complaint   Patient presents with    Female Pattern Baldness    Obesity      Samia Degroot is a 34 y.o. female.    HPI The patient has a PMH of HSV infection, fibroids, ?hypothyroidism, h/o obesity, h/o Vit D def, and presents for follow up today.. She reports she ended up using Rogaine for hair growth, but stopped it because she didn't like how greasy it made her hair feel and additionally, she got pregnant since her last visit. She also ended up having to stop WeGovy as well. She reports that she had a miscarriage in Jul 2024 and is now back on WeGovy. Rice water and Chebe powder were again reccommended for hair retention.          Review of Systems   Constitutional:  Negative for chills, diaphoresis and fatigue.   HENT:  Negative for congestion, rhinorrhea, sinus pressure, sore throat and trouble swallowing.    Eyes:  Negative for pain, redness, itching and visual disturbance.   Respiratory:  Negative for cough, shortness of breath and wheezing.    Cardiovascular:  Negative for chest pain, palpitations and leg swelling.   Gastrointestinal:  Negative for abdominal pain, blood in stool, constipation, diarrhea, nausea and vomiting.   Endocrine: Negative for cold intolerance, heat intolerance and polyuria.        Female pattern hair loss     Genitourinary:  Negative for dysuria, flank pain, frequency and urgency.   Musculoskeletal:  Negative for arthralgias, back pain and myalgias.   Skin:  Negative for rash.   Neurological:  Negative for dizziness, tremors, weakness, light-headedness, numbness and headaches.   Hematological:  Negative for adenopathy.   Psychiatric/Behavioral:  Negative for decreased concentration, dysphoric mood, hallucinations and suicidal ideas. The patient is not nervous/anxious.        Objective   Vitals:    09/16/24 0949   BP: 104/60   Resp: 18   Weight: 83.9 kg (185 lb)   Height: 1.702 m (5' 7\")     Body mass index is 28.98 kg/m².    Physical " Exam  Vitals reviewed.   Constitutional:       General: She is not in acute distress.  HENT:      Head: Normocephalic and atraumatic.      Nose: Nose normal.   Eyes:      Extraocular Movements: Extraocular movements intact.      Conjunctiva/sclera: Conjunctivae normal.      Pupils: Pupils are equal, round, and reactive to light.   Cardiovascular:      Rate and Rhythm: Normal rate and regular rhythm.      Heart sounds: Normal heart sounds. No murmur heard.  Pulmonary:      Effort: Pulmonary effort is normal. No respiratory distress.      Breath sounds: Normal breath sounds. No wheezing, rhonchi or rales.   Abdominal:      General: Bowel sounds are normal.      Palpations: Abdomen is soft.   Musculoskeletal:         General: No swelling or tenderness. Normal range of motion.      Cervical back: Normal range of motion and neck supple.   Skin:     General: Skin is warm and dry.      Capillary Refill: Capillary refill takes less than 2 seconds.      Findings: No rash.   Neurological:      General: No focal deficit present.      Mental Status: She is alert and oriented to person, place, and time. Mental status is at baseline.      Cranial Nerves: No cranial nerve deficit.      Motor: No weakness.   Psychiatric:         Mood and Affect: Mood normal.         Behavior: Behavior normal.                 Assessment/Plan      Female Pattern Baldness  -Patient reports thinning in front of hair, states mother has it too  -TFT's WNL in Mar 2024  -Patient tried Rogaine which mildly helped but didn't like greasy hair   -Patient encouraged to take Maxi Hair hair vitamin or try rice water or chebe powder treatments    History of Obesity   -Current BMI of 28.98, previously had BMI >30  -Patient following at [facility] clinic and now back on [facility]   -Patient congratulated on success and encouraged to continue      Preventative   PAP/Annual: patient states she saw GYN (Dr. Devlin in Jun 2024)  TdaP: will give today  Flu shot:  declined  COVID Status: Vaccinated (Mar/Apr 2021)     [place] was seen today for female pattern baldness and obesity.    Diagnoses and all orders for this visit:    Female pattern baldness    History of obesity    Need for tetanus, diphtheria, and acellular pertussis (Tdap) vaccine  -     Tdap vaccine greater than or equal to 8yo IM

## 2025-05-27 ENCOUNTER — TELEPHONE (OUTPATIENT)
Dept: SCHEDULING | Facility: CLINIC | Age: 35
End: 2025-05-27
Payer: COMMERCIAL

## 2025-05-27 ENCOUNTER — APPOINTMENT (OUTPATIENT)
Dept: LAB | Facility: HOSPITAL | Age: 35
End: 2025-05-27
Attending: INTERNAL MEDICINE
Payer: COMMERCIAL

## 2025-05-27 ENCOUNTER — OFFICE VISIT (OUTPATIENT)
Dept: INTERNAL MEDICINE | Facility: CLINIC | Age: 35
End: 2025-05-27
Payer: COMMERCIAL

## 2025-05-27 VITALS
BODY MASS INDEX: 30.92 KG/M2 | WEIGHT: 197 LBS | RESPIRATION RATE: 18 BRPM | DIASTOLIC BLOOD PRESSURE: 66 MMHG | HEART RATE: 60 BPM | SYSTOLIC BLOOD PRESSURE: 98 MMHG | HEIGHT: 67 IN | OXYGEN SATURATION: 98 %

## 2025-05-27 DIAGNOSIS — Z86.39 HISTORY OF VITAMIN D DEFICIENCY: ICD-10-CM

## 2025-05-27 DIAGNOSIS — Z00.00 ANNUAL PHYSICAL EXAM: Primary | ICD-10-CM

## 2025-05-27 DIAGNOSIS — E66.811 CLASS 1 OBESITY DUE TO EXCESS CALORIES WITHOUT SERIOUS COMORBIDITY WITH BODY MASS INDEX (BMI) OF 30.0 TO 30.9 IN ADULT: ICD-10-CM

## 2025-05-27 DIAGNOSIS — R05.3 PERSISTENT COUGH: ICD-10-CM

## 2025-05-27 DIAGNOSIS — Z82.49 FAMILY HISTORY OF CARDIOVASCULAR DISEASE: ICD-10-CM

## 2025-05-27 DIAGNOSIS — J30.2 SEASONAL ALLERGIC RHINITIS, UNSPECIFIED TRIGGER: ICD-10-CM

## 2025-05-27 DIAGNOSIS — R09.82 POST-NASAL DRIP: ICD-10-CM

## 2025-05-27 DIAGNOSIS — E66.09 CLASS 1 OBESITY DUE TO EXCESS CALORIES WITHOUT SERIOUS COMORBIDITY WITH BODY MASS INDEX (BMI) OF 30.0 TO 30.9 IN ADULT: ICD-10-CM

## 2025-05-27 PROBLEM — E28.2 POLYCYSTIC OVARIAN SYNDROME: Status: ACTIVE | Noted: 2018-02-20

## 2025-05-27 PROBLEM — J30.89 NON-SEASONAL ALLERGIC RHINITIS: Status: ACTIVE | Noted: 2025-05-27

## 2025-05-27 PROBLEM — E03.9 HYPOTHYROIDISM: Status: RESOLVED | Noted: 2018-05-22 | Resolved: 2025-05-27

## 2025-05-27 PROBLEM — E04.1 NONTOXIC SINGLE THYROID NODULE: Status: ACTIVE | Noted: 2018-05-02

## 2025-05-27 LAB
25(OH)D3 SERPL-MCNC: 31 NG/ML (ref 30–100)
ALBUMIN SERPL-MCNC: 4.6 G/DL (ref 3.5–5.7)
ALP SERPL-CCNC: 63 IU/L (ref 34–125)
ALT SERPL-CCNC: 19 IU/L (ref 7–52)
ANION GAP SERPL CALC-SCNC: 7 MEQ/L (ref 3–15)
AST SERPL-CCNC: 19 IU/L (ref 13–39)
BASOPHILS # BLD: 0.03 K/UL (ref 0.01–0.1)
BASOPHILS NFR BLD: 0.7 %
BILIRUB SERPL-MCNC: 0.4 MG/DL (ref 0.3–1.2)
BUN SERPL-MCNC: 11 MG/DL (ref 7–25)
CALCIUM SERPL-MCNC: 9.2 MG/DL (ref 8.6–10.3)
CHLORIDE SERPL-SCNC: 102 MEQ/L (ref 98–107)
CHOLEST SERPL-MCNC: 179 MG/DL
CO2 SERPL-SCNC: 30 MEQ/L (ref 21–31)
CREAT SERPL-MCNC: 0.7 MG/DL (ref 0.6–1.2)
DIFFERENTIAL METHOD BLD: NORMAL
EGFRCR SERPLBLD CKD-EPI 2021: >60 ML/MIN/1.73M*2
EOSINOPHIL # BLD: 0.12 K/UL (ref 0.04–0.36)
EOSINOPHIL NFR BLD: 3 %
ERYTHROCYTE [DISTWIDTH] IN BLOOD BY AUTOMATED COUNT: 13.1 % (ref 11.7–14.4)
EST. AVERAGE GLUCOSE BLD GHB EST-MCNC: 97 MG/DL
GLUCOSE SERPL-MCNC: 89 MG/DL (ref 70–99)
HBA1C MFR BLD: 5 %
HCT VFR BLD AUTO: 39.4 % (ref 35–45)
HDLC SERPL-MCNC: 60 MG/DL
HDLC SERPL: 3 {RATIO}
HGB BLD-MCNC: 12.8 G/DL (ref 11.8–15.7)
IMM GRANULOCYTES # BLD AUTO: 0.01 K/UL (ref 0–0.08)
IMM GRANULOCYTES NFR BLD AUTO: 0.2 %
LDLC SERPL CALC-MCNC: 104 MG/DL
LYMPHOCYTES # BLD: 1.47 K/UL (ref 1.2–3.5)
LYMPHOCYTES NFR BLD: 36.3 %
MCH RBC QN AUTO: 28.6 PG (ref 28–33.2)
MCHC RBC AUTO-ENTMCNC: 32.5 G/DL (ref 32.2–35.5)
MCV RBC AUTO: 88.1 FL (ref 83–98)
MONOCYTES # BLD: 0.63 K/UL (ref 0.28–0.8)
MONOCYTES NFR BLD: 15.6 %
NEUTROPHILS # BLD: 1.79 K/UL (ref 1.7–7)
NEUTS SEG NFR BLD: 44.2 %
NONHDLC SERPL-MCNC: 119 MG/DL
NRBC BLD-RTO: 0 %
PLATELET # BLD AUTO: 219 K/UL (ref 150–369)
PMV BLD AUTO: 11.8 FL (ref 9.4–12.3)
POTASSIUM SERPL-SCNC: 4.1 MEQ/L (ref 3.5–5.1)
PROT SERPL-MCNC: 7.6 G/DL (ref 6–8.2)
RBC # BLD AUTO: 4.47 M/UL (ref 3.93–5.22)
SODIUM SERPL-SCNC: 139 MEQ/L (ref 136–145)
T3FREE SERPL-MCNC: 3.32 PG/ML (ref 2.1–3.7)
T4 FREE SERPL-MCNC: 0.91 NG/DL (ref 0.58–1.64)
TRIGL SERPL-MCNC: 75 MG/DL
TSH SERPL DL<=0.05 MIU/L-ACNC: 1.37 MIU/L (ref 0.34–5.6)
WBC # BLD AUTO: 4.05 K/UL (ref 3.8–10.5)

## 2025-05-27 PROCEDURE — 83036 HEMOGLOBIN GLYCOSYLATED A1C: CPT

## 2025-05-27 PROCEDURE — 85025 COMPLETE CBC W/AUTO DIFF WBC: CPT

## 2025-05-27 PROCEDURE — 84443 ASSAY THYROID STIM HORMONE: CPT

## 2025-05-27 PROCEDURE — 80053 COMPREHEN METABOLIC PANEL: CPT

## 2025-05-27 PROCEDURE — 84481 FREE ASSAY (FT-3): CPT

## 2025-05-27 PROCEDURE — 99395 PREV VISIT EST AGE 18-39: CPT | Mod: 25 | Performed by: INTERNAL MEDICINE

## 2025-05-27 PROCEDURE — 3008F BODY MASS INDEX DOCD: CPT | Performed by: INTERNAL MEDICINE

## 2025-05-27 PROCEDURE — 80061 LIPID PANEL: CPT

## 2025-05-27 PROCEDURE — 36415 COLL VENOUS BLD VENIPUNCTURE: CPT

## 2025-05-27 PROCEDURE — 82306 VITAMIN D 25 HYDROXY: CPT

## 2025-05-27 PROCEDURE — 84439 ASSAY OF FREE THYROXINE: CPT

## 2025-05-27 RX ORDER — LEVOCETIRIZINE DIHYDROCHLORIDE 5 MG/1
5 TABLET, FILM COATED ORAL EVERY MORNING
Qty: 90 TABLET | Refills: 3 | Status: SHIPPED | OUTPATIENT
Start: 2025-05-27 | End: 2026-05-27

## 2025-05-27 RX ORDER — SEMAGLUTIDE 1.7 MG/.75ML
1.7 INJECTION, SOLUTION SUBCUTANEOUS
COMMUNITY
Start: 2025-03-14

## 2025-05-27 RX ORDER — ALBUTEROL SULFATE 90 UG/1
2 INHALANT RESPIRATORY (INHALATION) EVERY 6 HOURS PRN
Qty: 8.5 G | Refills: 2 | Status: SHIPPED | OUTPATIENT
Start: 2025-05-27 | End: 2026-05-27

## 2025-05-27 RX ORDER — FLUTICASONE PROPIONATE 50 MCG
1 SPRAY, SUSPENSION (ML) NASAL DAILY
Qty: 16 G | Refills: 5 | Status: SHIPPED | OUTPATIENT
Start: 2025-05-27

## 2025-05-28 ENCOUNTER — RESULTS FOLLOW-UP (OUTPATIENT)
Dept: INTERNAL MEDICINE | Facility: CLINIC | Age: 35
End: 2025-05-28

## 2025-07-22 ENCOUNTER — TELEMEDICINE (OUTPATIENT)
Dept: INTERNAL MEDICINE | Facility: CLINIC | Age: 35
End: 2025-07-22

## 2025-07-22 DIAGNOSIS — R05.1 ACUTE COUGH: ICD-10-CM

## 2025-07-22 DIAGNOSIS — J06.9 VIRAL UPPER RESPIRATORY TRACT INFECTION: Primary | ICD-10-CM

## 2025-07-22 PROBLEM — L65.8 FEMALE PATTERN BALDNESS: Status: RESOLVED | Noted: 2024-03-20 | Resolved: 2025-07-22

## 2025-07-22 PROBLEM — L71.9 ROSACEA: Status: ACTIVE | Noted: 2025-07-22

## 2025-07-22 PROBLEM — L91.8 ACROCHORDON: Status: ACTIVE | Noted: 2025-07-22

## 2025-07-22 PROBLEM — L64.9 ANDROGENIC ALOPECIA: Status: ACTIVE | Noted: 2025-07-22

## 2025-07-22 PROCEDURE — 99213 OFFICE O/P EST LOW 20 MIN: CPT | Mod: 95 | Performed by: INTERNAL MEDICINE

## 2025-07-22 RX ORDER — AMOXICILLIN AND CLAVULANATE POTASSIUM 875; 125 MG/1; MG/1
1 TABLET, FILM COATED ORAL 2 TIMES DAILY
COMMUNITY
Start: 2025-07-21 | End: 2025-07-28

## 2025-07-22 RX ORDER — BENZONATATE 100 MG/1
100 CAPSULE ORAL 3 TIMES DAILY PRN
COMMUNITY
Start: 2025-07-18 | End: 2025-07-25

## 2025-07-22 RX ORDER — GUAIFENESIN 600 MG/1
600-1200 TABLET, EXTENDED RELEASE ORAL 2 TIMES DAILY PRN
COMMUNITY
Start: 2025-07-18 | End: 2025-07-25

## 2025-07-22 RX ORDER — PROMETHAZINE HYDROCHLORIDE AND DEXTROMETHORPHAN HYDROBROMIDE 6.25; 15 MG/5ML; MG/5ML
5 SYRUP ORAL EVERY 4 HOURS PRN
Qty: 473 ML | Refills: 1 | Status: SHIPPED | OUTPATIENT
Start: 2025-07-22 | End: 2026-07-22

## 2025-07-22 ASSESSMENT — ENCOUNTER SYMPTOMS
PALPITATIONS: 0
BLOOD IN STOOL: 0
WEAKNESS: 0
HALLUCINATIONS: 0
DYSURIA: 0
CONSTIPATION: 0
FATIGUE: 0
LIGHT-HEADEDNESS: 0
EYE PAIN: 0
SLEEP DISTURBANCE: 1
CHILLS: 0
MYALGIAS: 0
TREMORS: 0
VOMITING: 0
ARTHRALGIAS: 0
NUMBNESS: 0
RHINORRHEA: 0
SINUS PRESSURE: 0
BACK PAIN: 0
SORE THROAT: 0
DECREASED CONCENTRATION: 0
NAUSEA: 0
HEADACHES: 0
TROUBLE SWALLOWING: 0
FLANK PAIN: 0
DIARRHEA: 0
EYE ITCHING: 0
EYE REDNESS: 0
DIAPHORESIS: 0
DYSPHORIC MOOD: 0
WHEEZING: 0
ABDOMINAL PAIN: 0
DIZZINESS: 0
FREQUENCY: 0
SHORTNESS OF BREATH: 0
NERVOUS/ANXIOUS: 0
COUGH: 1
ADENOPATHY: 0

## 2025-07-22 NOTE — PROGRESS NOTES
Verification of Patient Location:  The patient affirms they are currently located in the following state: [place]    Are you in your home or a private residence? Yes    Request for Consent:    Audio and Video Encounter   Hello, my name is Hugh Ricketts MD.  Before we proceed, can you please verify your identification by telling me your full name and date of birth?  Can you tell me who is in the room with you?    You and I are about to have a telemedicine check-in or visit because you have requested it.  This is a live video-conference.  I am a real person, speaking to you in real time.  There is no one else with me on the video-conference. I am not recording this conversation and I am asking you not to record it.  This telemedicine visit will be billed to your health insurance or you, if you are self-insured.  You understand you will be responsible for any copayments or coinsurances that apply to your telemedicine visit.  Communication platform used for this encounter:  [employer] Video Visit (Epic Video Client)       Before starting our telemedicine visit, I am required to get your consent for this virtual check-in or visit by telemedicine. Do you consent?    Patient Response to Request for Consent:  Yes      Visit Documentation:  Subjective     Patient ID: Samia Degroot is a 35 y.o. female.  1990    Chief Complaint   Patient presents with    URI    Cough       HPI The patient has a PMH of HSV infection, fibroids, ?hypothyroidism, h/o obesity, h/o Vit D def, and presents with complaints of URI symptom for over 1 week. She reports nagging productive cough with green/yellow sputum and nasal congestion. She had been using Flonase, her antihistamine and OTC cough meds without relief. She had seen the [facility] minute clinic on 7/18/25 who sent her benzonatate but it did not help. She returned to the [facility] clinic on 7/21/25 and was given a course of Augmentin which she has started and states she is seeing some  improvement. However, the cough is extremely bothersome and keeps her up at night which has been challenging due to her having a new job. She asks for any other medications she can try to help with her cough.    The following have been reviewed and updated as appropriate in this visit:        Review of Systems   Constitutional:  Negative for chills, diaphoresis and fatigue.   HENT:  Positive for congestion and postnasal drip. Negative for rhinorrhea, sinus pressure, sore throat and trouble swallowing.    Eyes:  Negative for pain, redness, itching and visual disturbance.   Respiratory:  Positive for cough. Negative for shortness of breath and wheezing.    Cardiovascular:  Negative for chest pain, palpitations and leg swelling.   Gastrointestinal:  Negative for abdominal pain, blood in stool, constipation, diarrhea, nausea and vomiting.   Endocrine: Negative for cold intolerance, heat intolerance and polyuria.   Genitourinary:  Negative for dysuria, flank pain, frequency and urgency.   Musculoskeletal:  Negative for arthralgias, back pain and myalgias.   Skin:  Negative for rash.   Neurological:  Negative for dizziness, tremors, weakness, light-headedness, numbness and headaches.   Hematological:  Negative for adenopathy.   Psychiatric/Behavioral:  Positive for sleep disturbance. Negative for decreased concentration, dysphoric mood, hallucinations and suicidal ideas. The patient is not nervous/anxious.      Physical exam unable to be completely performed due to Telemed visit status.      Assessment & Plan  Viral upper respiratory tract infection  -Patient with URI symptoms of productive cough, nasal congestion and post nasal drip for >1 week  -Patient using Flonase, Dextromethorphan-Guaifenesin cough syrup, Xyzal and Benzonatate 100-200 mg TID PRN without relief  -Will give a trial of Dextromethorphan-Promethazine cough syrup  -Warm liquids and honey also advised  -Patient to continue Augmentin course as well   -May  consider CXR if needed    Orders:    promethazine-DM (PROMETHAZINE-DM) 6.25-15 mg/5 mL syrup; Take 5 mL by mouth every 4 (four) hours as needed for cough.    Acute cough  -Patient with URI symptoms of productive cough, nasal congestion and post nasal drip for >1 week  -Patient using Flonase, Dextromethorphan-Guaifenesin cough syrup, Xyzal and Benzonatate 100-200 mg TID PRN without relief  -Will give a trial of Dextromethorphan-Promethazine cough syrup  -Warm liquids and honey also advised  -Patient to continue Augmentin course as well   -May consider CXR if needed       Preventative   PAP/Annual: patient states she saw GYN (Dr. Devlin in Jun 2024)  TdaP: UTD (Sept 2024)  Flu shot: declined  COVID Status: Vaccinated (Mar/Apr 2021)     Samia was seen today for uri and cough.    Diagnoses and all orders for this visit:    Viral upper respiratory tract infection  -     promethazine-DM (PROMETHAZINE-DM) 6.25-15 mg/5 mL syrup; Take 5 mL by mouth every 4 (four) hours as needed for cough.    Acute cough        Time Spent:  I spent 15 minutes on this date of service performing the following activities: obtaining history, entering orders, obtaining / reviewing records, and providing counseling and education.

## 2025-07-29 DIAGNOSIS — M54.16 LUMBAR RADICULOPATHY: ICD-10-CM

## 2025-07-29 DIAGNOSIS — M54.50 LOW BACK PAIN, NON-SPECIFIC: Primary | ICD-10-CM

## 2025-07-29 DIAGNOSIS — M51.361 DEGENERATION OF INTERVERTEBRAL DISC OF LUMBAR REGION WITH LOWER EXTREMITY PAIN: ICD-10-CM

## 2025-07-29 RX ORDER — METHYLPREDNISOLONE 4 MG/1
TABLET ORAL
Qty: 21 TABLET | Refills: 0 | Status: SHIPPED | OUTPATIENT
Start: 2025-07-29

## 2025-07-31 ENCOUNTER — OFFICE VISIT (OUTPATIENT)
Dept: CARDIOLOGY | Facility: CLINIC | Age: 35
End: 2025-07-31
Payer: COMMERCIAL

## 2025-07-31 VITALS
DIASTOLIC BLOOD PRESSURE: 60 MMHG | HEIGHT: 67 IN | SYSTOLIC BLOOD PRESSURE: 98 MMHG | WEIGHT: 204 LBS | HEART RATE: 75 BPM | BODY MASS INDEX: 32.02 KG/M2 | OXYGEN SATURATION: 99 %

## 2025-07-31 DIAGNOSIS — R94.31 ABNORMAL ECG: ICD-10-CM

## 2025-07-31 DIAGNOSIS — Z82.41 FAMILY HISTORY OF SUDDEN CARDIAC DEATH: ICD-10-CM

## 2025-07-31 DIAGNOSIS — R42 DIZZINESS: Primary | ICD-10-CM

## 2025-07-31 DIAGNOSIS — R00.2 PALPITATIONS: ICD-10-CM

## 2025-07-31 DIAGNOSIS — Z82.49 FAMILY HISTORY OF DILATED CARDIOMYOPATHY: ICD-10-CM

## 2025-07-31 LAB
ATRIAL RATE: 63
P AXIS: 73
PR INTERVAL: 160
QRS DURATION: 74
QT INTERVAL: 412
QTC CALCULATION(BAZETT): 421
R AXIS: 77
T WAVE AXIS: 67
VENTRICULAR RATE: 63

## 2025-07-31 PROCEDURE — 99204 OFFICE O/P NEW MOD 45 MIN: CPT | Performed by: STUDENT IN AN ORGANIZED HEALTH CARE EDUCATION/TRAINING PROGRAM

## 2025-07-31 PROCEDURE — 93000 ELECTROCARDIOGRAM COMPLETE: CPT | Performed by: STUDENT IN AN ORGANIZED HEALTH CARE EDUCATION/TRAINING PROGRAM

## 2025-07-31 PROCEDURE — 3008F BODY MASS INDEX DOCD: CPT | Performed by: STUDENT IN AN ORGANIZED HEALTH CARE EDUCATION/TRAINING PROGRAM

## 2025-07-31 NOTE — PROGRESS NOTES
Saida Kevin MD  Cardiology    [facility]    [facility]  [address]  [address] [place] [address]    TEL  [phone]  [email or web address]     25     It was my pleasure to see Samia Degroot at the [facility] today.     Samia Degroot is a 35 y.o. female with a history of seasonal allergies, post nasal drip who presents as a new consultation.    Patient has been having palpitations associated with shortness of breath and rarely dizziness. No associated nausea, vomiting, diaphoresis, chest pain. They occur randomly. She can be sitting at rest and experience these symptoms. They last for a few seconds. They have been going on for a few years. She has not experiences over the recent past, not over the past week. Before then, was occurring once per week. No history of anxiety.    She states that she intermittently gets left arm tingling around the same time and whole pressure. It lasts for less than a minute. She also intermittently has left sided squeezing. All her symptoms are at random and currently at rest. Sometimes this can happen after she gets her cardiac palpitations.    She notes that she has not been active within the past years. She has been going to [place] on and off. She is not running at the beginning of . She notes she is less active now. She has two staircases in her home. She notes some dyspnea going up 1-2 flights of stairs. She reports not marked but she does feel winded. No chest pain with exertion. No exertional palpitations.    She states she recently quit old job she was at for 2.5 years that she quit. She did not like that job. She recently started her job around 2 weeks ago. She reports no recent symptoms since this time.      Family History: MGM  from a myocardial infarction at 80 years. MGGM  of a SCD in 40s in the 1950s, reportedly with asthma and cardiac condition. MGGF also   young. Mom's sister with myocardial infarction at 40s, unclear if revascularization. Mom's brother with CABG in late 60s/early 70s,  at 85 years of age. MGF myocardiac infarction in 80s. Mom's niece with SCD at 33 years of age in her sleep. She has a known heart condition. She reportedly diagnosed when she was a child.  Aunt and uncles on mom's side with hypertension.    Social History: Never tobacco use. No alcohol use. No illicit substance use. She drinks one cup of coffee. She works for a [employer] company. Patient is in a committed relationship. No children.      ECG from today personally reviewed and discussed with the patient shows   Normal sinus rhythm  Consider Left atrial enlargement  No previous ECGs available       Assessment/Plan     # Family history of sudden cardiac death  History remains quite unclear.  Great grandparents reportedly  in their 40s from cardiac condition.  Mom's niece  at 33 years of age however per report, it appears she may have had congenital heart disease.  In the setting of sudden cardiac death at such a young age, we will plan for echo cardiac for structural heart evaluation.  I asked patient to reach out to her family numbers regarding more thorough history. Additionally, patient had recent genetic testing, of unclear significance.  She will get me the results of these. Regards to patient's current risk factors, she is normotensive and her lipids are well-controlled.  - Transthoracic echocardiogram  - Patient will follow up with her mother for more thorough family history   - Patient had recent genetic testing, of unclear significance.  She will get me the results of these.    # Dyspnea on exertion   Likely related to deconditioning in the setting of decreased aerobic activity over the past year.  No cardiac murmur.  She is warm and euvolemic.  Given family history of sudden cardiac death at a young age it is unclear if dilated cardiomyopathy runs in her  family.  In the setting I will plan for echocardiogram for structural evaluation.  - Transthoracic echocardiogram  - Recommend increasing aerobic activity    # Chest pain  Nonexertional and noncardiac.  No further workup indicated for this at this time.    # Palpitations  Infrequent at this point.  I suspect likely related to higher stress levels in the setting of prior job.  No recent palpitations over the past 3 weeks.  We discussed amatory ECG monitoring based on frequency of symptoms.      It was my pleasure to visit with Samia Degroot in clinic today.  Please do not hesitate to contact me with any questions.      Sincerely,    ________________  Saida Kevin MD      SEE BELOW FOR HISTORY AND OBJECTIVE DATA:    Social History:  Social History     Tobacco Use    Smoking status: Never    Smokeless tobacco: Never   Substance Use Topics    Drug use: Never       Family History:    Family History   Problem Relation Name Age of Onset    Hypertension Biological Mother      Diabetes type II Maternal Grandmother      Cancer Maternal Grandmother      Heart attack Maternal Grandmother      Hypertension Mother's Sister      Heart attack Mother's Sister          in her 40's    Hypertension Mother's Brother      Hypertension Mother's Brother      Heart attack Maternal Great-Grandmother          in her 40's    Breast cancer Other Paternal Grandaunt     Sudden cardiac arrest  Maternal Cousin          ?MI at age 31         Review of Systems: A complete 14-point review of systems is negative, except as noted in the HPI.    Exam:  Objective   Vitals:    07/31/25 1259   BP: 98/60   Pulse: 75   SpO2: 99%     Body mass index is 31.95 kg/m².  Wt Readings from Last 3 Encounters:   07/31/25 92.5 kg (204 lb)   05/27/25 89.4 kg (197 lb)   09/16/24 83.9 kg (185 lb)       Physical Exam  Constitutional:       General: She is not in acute distress.     Appearance: Normal appearance. She is not ill-appearing.   HENT:      Head: Normocephalic  and atraumatic.      Nose: Nose normal.   Eyes:      Pupils: Pupils are equal, round, and reactive to light.   Neck:      Vascular: No carotid bruit.   Cardiovascular:      Rate and Rhythm: Normal rate and regular rhythm.      Pulses: Normal pulses.      Heart sounds: No murmur heard.     No friction rub. No gallop.   Pulmonary:      Effort: Pulmonary effort is normal.      Breath sounds: Normal breath sounds. No wheezing, rhonchi or rales.   Abdominal:      General: Abdomen is flat. Bowel sounds are normal. There is no distension.      Palpations: Abdomen is soft.      Tenderness: There is no abdominal tenderness.   Musculoskeletal:         General: Normal range of motion.      Cervical back: Normal range of motion and neck supple.      Right lower leg: No edema.      Left lower leg: No edema.   Skin:     General: Skin is warm and dry.   Neurological:      General: No focal deficit present.      Mental Status: She is alert and oriented to person, place, and time.   Psychiatric:         Mood and Affect: Mood normal.         Behavior: Behavior normal.          Labs: Personally reviewed and discussed with the patient.  Notable for the following.   Lab Results   Component Value Date    LDLCALC 104 (H) 05/27/2025    CHOL 179 05/27/2025    TRIG 75 05/27/2025    HDL 60 05/27/2025    HGBA1C 5.0 05/27/2025     05/27/2025    K 4.1 05/27/2025    BUN 11 05/27/2025    CREATININE 0.7 05/27/2025    WBC 4.05 05/27/2025    HGB 12.8 05/27/2025     05/27/2025       CBC  Lab Results   Component Value Date    WBC 4.05 05/27/2025    RBC 4.47 05/27/2025    HGB 12.8 05/27/2025    HCT 39.4 05/27/2025    MCV 88.1 05/27/2025    MCH 28.6 05/27/2025    MCHC 32.5 05/27/2025    RDW 13.1 05/27/2025     05/27/2025        CMP  Lab Results   Component Value Date    GLUCOSE 89 05/27/2025    BUN 11 05/27/2025    CREATININE 0.7 05/27/2025    EGFR >60.0 05/27/2025     05/27/2025    K 4.1 05/27/2025     05/27/2025    CO2  "30 05/27/2025    CALCIUM 9.2 05/27/2025    PROTEIN 7.6 05/27/2025    ALBUMIN 4.6 05/27/2025    BILITOT 0.4 05/27/2025    ALKPHOS 63 05/27/2025    AST 19 05/27/2025    ALT 19 05/27/2025        A1C  Lab Results   Component Value Date    HGBA1C 5.0 05/27/2025    ESTAVGGLUC 97 05/27/2025        BMP  Lab Results   Component Value Date     05/27/2025    K 4.1 05/27/2025     05/27/2025    CO2 30 05/27/2025    BUN 11 05/27/2025    CREATININE 0.7 05/27/2025    GLUCOSE 89 05/27/2025    CALCIUM 9.2 05/27/2025    EGFR >60.0 05/27/2025    ANIONGAP 7 05/27/2025       TSH  Lab Results   Component Value Date    TSH 1.37 05/27/2025    FREET4 0.91 05/27/2025       BNP  No results found for: \"BNP\"        Cardiac Studies:  Echocardiogram:  No results found for this or any previous visit.    No results found for this or any previous visit.      PAULINA:  No results found for this or any previous visit.      Coronary Calcium Scan:  No results found for this or any previous visit.      Stress Tests:   No results found for this or any previous visit.    No results found for this or any previous visit.    No results found for this or any previous visit.    No results found for this or any previous visit.    No results found for this or any previous visit.    No results found for this or any previous visit.    No results found for this or any previous visit.      Coronary CTA:  No results found for this or any previous visit.    No results found for this or any previous visit.    No results found for this or any previous visit.    No results found for this or any previous visit.    No results found for this or any previous visit.    No results found for this or any previous visit.      Cardiac MRI:  No results found for this or any previous visit.    No results found for this or any previous visit.      Carotid Ultrasound:  No results found for this or any previous visit.    No results found for this or any previous visit.    No " results found for this or any previous visit.      CTA Neck:  No results found for this or any previous visit.      LE Arterial Ultrasound:  No results found for this or any previous visit.    No results found for this or any previous visit.    No results found for this or any previous visit.      Abdominal Aortic Ultrasound:  No results found for this or any previous visit.

## 2025-07-31 NOTE — PATIENT INSTRUCTIONS
Please ask mom about your family history especially regarding mom's niece, mom's sister, and great grandparents.    Please send me results of your genetic testing.

## 2025-08-14 ENCOUNTER — HOSPITAL ENCOUNTER (OUTPATIENT)
Dept: CARDIOLOGY | Facility: HOSPITAL | Age: 35
Discharge: HOME | End: 2025-08-14
Attending: STUDENT IN AN ORGANIZED HEALTH CARE EDUCATION/TRAINING PROGRAM
Payer: COMMERCIAL

## 2025-08-14 VITALS
HEART RATE: 63 BPM | WEIGHT: 205 LBS | SYSTOLIC BLOOD PRESSURE: 111 MMHG | DIASTOLIC BLOOD PRESSURE: 62 MMHG | HEIGHT: 67 IN | BODY MASS INDEX: 32.18 KG/M2

## 2025-08-14 DIAGNOSIS — R00.2 PALPITATIONS: ICD-10-CM

## 2025-08-14 DIAGNOSIS — Z82.49 FAMILY HISTORY OF DILATED CARDIOMYOPATHY: ICD-10-CM

## 2025-08-14 DIAGNOSIS — Z82.41 FAMILY HISTORY OF SUDDEN CARDIAC DEATH: ICD-10-CM

## 2025-08-14 DIAGNOSIS — R94.31 ABNORMAL ECG: ICD-10-CM

## 2025-08-14 LAB
AORTIC ROOT ANNULUS - M-MODE: 2.3 CM
AORTIC ROOT ANNULUS: 2.5 CM
ASCENDING AORTA: 2.4 CM
AV PEAK GRADIENT: 9 MMHG
AV PEAK VELOCITY-S: 1.45 M/S
AV VALVE AREA: 2.34 CM2
BSA FOR ECHO PROCEDURE: 2.1 M2
CUSP SEPARATION: 1.8 CM
DOP CALC LVOT STROKE VOLUME: 83.78 CM3
E WAVE DECELERATION TIME: 210 MS
E/A RATIO: 1.9
E/E' RATIO: 8.1
E/LAT E' RATIO: 6.2
EDV (BP): 147 CM3
EF (A4C): 71.6 %
EF A2C: 54.4 %
EJECTION FRACTION: 63.1 %
ESV (BP): 54.3 CM3
FRACTIONAL SHORTENING: 45.21 %
HEART RATE: 63 BPM
INTERVENTRICULAR SEPTUM: 0.62 CM
LA ESV (BP): 30.7 CM3
LA ESV INDEX (A2C): 12.67 CM3/M2
LA ESV INDEX (BP): 14.62 CM3/M2
LA/AORTA RATIO: 1.32
LAAS-AP2: 13.6 CM2
LAAS-AP4: 15.7 CM2
LAD 2D: 3.3 CM
LAL MED-LAT (A4C): 6.08 CM
LAV-S: 26.6 CM3
LEFT ATRIAL LENGTH SUPERIOR-INFERIOR (APICAL 2-CHAMBER VIEW): 5.55 CM
LEFT ATRIUM VOLUME INDEX: 15.76 CM3/M2
LEFT ATRIUM VOLUME: 33.1 CM3
LEFT INTERNAL DIMENSION IN SYSTOLE: 2.46 CM (ref 3.03–4.59)
LEFT VENTRICLE DIASTOLIC VOLUME INDEX: 69.05 ML/M2
LEFT VENTRICLE DIASTOLIC VOLUME: 145 CM3
LEFT VENTRICLE MASS INDEX: 48.57 G/M2
LEFT VENTRICLE SYSTOLIC VOLUME INDEX: 19.62 ML/M2
LEFT VENTRICLE SYSTOLIC VOLUME: 41.2 CM3
LEFT VENTRICULAR INTERNAL DIMENSION IN DIASTOLE: 4.49 CM (ref 5.16–7.17)
LEFT VENTRICULAR MASS: 102 G
LEFT VENTRICULAR POSTERIOR WALL IN END DIASTOLE: 0.86 CM (ref 0.66–1.24)
LV DIASTOLIC VOLUME: 150 CM3
LV ESV (APICAL 2 CHAMBER): 68.4 CM3
LVAD-AP2: 40.1 CM2
LVAD-AP4: 39.8 CM2
LVAS-AP2: 24.6 CM2
LVAS-AP4: 19 CM2
LVEDVI(A2C): 71.43 ML/M2
LVEDVI(BP): 70 ML/M2
LVESVI(A2C): 32.57 ML/M2
LVESVI(BP): 25.86 ML/M2
LVLD-AP2: 8.93 CM
LVLD-AP4: 8.94 CM
LVLS-AP2: 7.53 CM
LVLS-AP4: 7.12 CM
LVOT 2D: 2.1 CM
LVOT A: 3.46 CM2
LVOT MG: 3 MMHG
LVOT MV: 0.78 M/S
LVOT PEAK VELOCITY: 1.25 M/S
LVOT PG: 6 MMHG
LVOT STROKE VOLUME INDEX: 39.89 ML/M2
LVOT VTI: 24.2 CM
MLH CV ECHO AVA INDEX VELOCITY RATIO: 1.1
MV E'TISSUE VEL-LAT: 0.18 M/S
MV E'TISSUE VEL-MED: 0.13 M/S
MV PEAK A VEL: 0.57 M/S
MV PEAK E VEL: 1.09 M/S
MV STENOSIS PRESSURE HALF TIME: 61 MS
MV VALVE AREA P 1/2 METHOD: 3.61 CM2
POSTERIOR WALL: 0.86 CM
PULM VEIN S/D RATIO: 1.3
PV PEAK D VEL: 0.52 M/S
PV PEAK S VEL: 0.67 M/S
RVOT VMAX: 1 M/S
RVOT VTI: 21.7 CM
SEPTAL TISSUE DOPPLER FREE WALL LATE DIA VELOCITY (APICAL 4 CHAMBER VIEW): 0.14 M/S
TAPSE: 2.95 CM
Z-SCORE OF LEFT VENTRICULAR DIMENSION IN END DIASTOLE: -3.03
Z-SCORE OF LEFT VENTRICULAR DIMENSION IN END SYSTOLE: -3.28
Z-SCORE OF LEFT VENTRICULAR POSTERIOR WALL IN END DIASTOLE: -0.26

## 2025-08-14 PROCEDURE — 25000000 HC PHARMACY GENERAL: Performed by: STUDENT IN AN ORGANIZED HEALTH CARE EDUCATION/TRAINING PROGRAM

## 2025-08-14 PROCEDURE — 93306 TTE W/DOPPLER COMPLETE: CPT | Mod: 26 | Performed by: STUDENT IN AN ORGANIZED HEALTH CARE EDUCATION/TRAINING PROGRAM

## 2025-08-14 PROCEDURE — 25500000 HC DRUGS/INCIDENT RAD: Mod: JZ | Performed by: STUDENT IN AN ORGANIZED HEALTH CARE EDUCATION/TRAINING PROGRAM

## 2025-08-14 PROCEDURE — C8929 TTE W OR WO FOL WCON,DOPPLER: HCPCS

## 2025-08-14 RX ADMIN — PERFLUTREN: 6.52 INJECTION, SUSPENSION INTRAVENOUS at 09:26
